# Patient Record
(demographics unavailable — no encounter records)

---

## 2017-01-01 NOTE — HP
- Maternal History


Mother's Age: 32


 Status: 


Mother's Blood Type: A+


HBSAG: Negative


Date: 16


RPR: Negative


Date: 16


Group B Strep: Positive


GBS Treated in Labor: Yes


HIV: Negative





- Maternal Risks


OB Risks:  x3.  IA x5.  h/o chlamydia, ASCUS & LGSIL





[place] Data





- Admission


Date of Admission: 17


Admission Time: 15:57


Date of Delivery: 17


Time of Delivery: 15:48


Wks Gestation by Dates: 40.5


Wks Gestation by Sono: 40.5


Infant Gender: Female


Type of Delivery: 


Apgar Score @1 Minute: 9


Apgar score @ 5 Minutes: 9


Birth Weight: 8 lb 0.221 oz


Birth Length: 19 in


Head Circumference, Admission: 35


Chest Circumference: 34.5


Abdominal Girth: 32





- Vital Signs


  ** Left Upper Arm


Blood Pressure: 64/45


Blood Pressure Mean: 51





  ** Left Calf


Blood Pressure: 64/36


Blood Pressure Mean: 45





  ** Right Upper Arm


Blood Pressure: 68/48


Blood Pressure Mean: 54





  ** Right Calf


Blood Pressure: 67/31


Blood Pressure Mean: 43





 Infant, Physical Exam





-  Infant, Admission Exam


Birth Weight: 8 lb 0.221 oz


Birth Length: 19 in


Chest Circumference: 34.5


Initial Vital Signs: 


 Initial Vital Signs











Temp Pulse Resp


 


 99.1 F   150   52 


 


 17 15:57  17 15:57  17 15:57











General Appearance: Yes: No Abnormalities


Skin: Yes: No Abnormalities


Head: Yes: No Abnormalities


Eyes: Yes: No Abnormalities


Ears: Yes: No Abnormalities


Nose: Yes: No Abnormalities


Mouth: Yes: No Abnormalities


Chest: Yes: No Abnormalities


Lungs/Respiratory: Yes: No Abnormalities


Cardiac: Yes: No Abnormalities


Abdomen: Yes: No Abnormalities


Gastrointestinal: Yes: No Abnormalities


Genitalia: No Abnormalities


Anus: Yes: No Abnormalities


Extremities: Yes: No Abnormalities


Clavicles: No abnormalities


Spine: Yes: No Abnormalities


Neuro: Yes: No Abnormalities





- Other Findings/Remarks


Other Findings/Remarks: 


1 day FT female born to 32  mom by . Enfamil. Routine care. Follow up 

[facility], [address] [place] [address], [address], upon discharge. [phone].

  Pt's mom GBS+ and treated x 1 with ampicillin with cbc results below.Will 

continue to monitor.  





 Laboratory Tests











  17





  21:45


 


WBC  21.6


 


RBC  6.40


 


Hgb  22.0


 


Hct  65.8


 


MCV  102.9


 


MCHC  33.5


 


RDW  16.8


 


Plt Count  355


 


MPV  8.1


 


Neutrophils %  59.0


 


Lymphocytes %  36.0


 


Monocytes %  5.0


 


Eosinophils %  0.0


 


Basophils %  0.0

## 2017-01-01 NOTE — DS
Physical Examination


Vital Signs: 


 Vital Signs











Temperature  98.9 F   07/07/17 20:10


 


Pulse Rate  150   07/06/17 15:57


 


Respiratory Rate  52   07/06/17 15:57


 


Blood Pressure  64/45   07/07/17 08:52


 


O2 Sat by Pulse Oximetry (%)      











Constitutional: Yes: Well Nourished, No Distress, Calm


Eyes: Yes: WNL, Conjunctiva Clear, EOM Intact


HENT: Yes: WNL, Atraumatic, Normocephalic


Neck: Yes: WNL, Supple, Trachea Midline


Cardiovascular: Yes: WNL, Regular Rate and Rhythm


Respiratory: Yes: WNL, Regular, CTA Bilaterally


Gastrointestinal: Yes: WNL, Normal Bowel Sounds


Musculoskeletal: Yes: WNL


Extremities: Yes: WNL


Edema: No


Integumentary: Yes: WNL


Neurological: Yes: WNL, Alert, Oriented


...Motor Strength: WNL


Psychiatric: Yes: WNL


Labs: 


 CBC, BMP





 07/06/17 21:45 











Discharge Summary


Condition: Good





- Instructions


Referrals: 


Alexy Harrison MD [Primary Care Provider] - 07/11/17 1:30 pm (F/u at [facility], [address] [place] [address], Phone [phone] on Tuesday 7/11/17 

at 1:30pm.)


Disposition: HOME





**Discharge Disposition





- Discharge Dispostion


Disposition: HOME


Condition at time of disposition: Good





- Referrals


Referrals: 


Alexy Harrison MD [Primary Care Provider] - 07/11/17 1:30 pm (F/u at [facility], [address] [place] [address], Phone [phone] on Tuesday 7/11/17 

at 1:30pm.)





- Patient Instructions





- Post Discharge Activity

## 2017-01-01 NOTE — PN
[place], Progress Note





- [place] Exam


Weight: 7 lb 13.046 oz


Chest Circumference: 34.5


Head Circumference: 35


Vital Signs: 


 Vital Signs











Temperature  98.9 F   17 20:10


 


Pulse Rate  150   17 15:57


 


Respiratory Rate  52   17 15:57


 


Blood Pressure  64/45   17 08:52


 


O2 Sat by Pulse Oximetry (%)      











General Appearance: Yes: No Abnormalities


Skin: Yes: No Abnormalities


Head: Yes: No Abnormalities


Eyes: Yes: No Abnormalities


Ears: Yes: No Abnormalities


Nose: Yes: No Abnormalities


Mouth: Yes: No Abnormalities


Chest: Yes: No Abnormalities


Lungs/Respiratory: Yes: No Abnormalities


Cardiac: Yes: No Abnormalities


Abdomen: Yes: No Abnormalities


Gastrointestinal: Yes: No Abnormalities


Genitalia: No Abnormalities


Anus: Yes: No Abnormalities


Extremities: Yes: No Abnormalities


Spine: Yes: No Abnormalities


Neuro: Yes: No Abnormalities


Cry: No Abnormalities





- Other Data/Findings


Labs, Other Data: 


 Intake





Intake, Oral Amount              10


Intake, Oral Amount              25


Intake, Oral Amount              15


Intake, Oral Amount              25





 Output





Number of Voids                  1


Stool Size                       Small


Stool Size                       Moderate


Stool Size                       Moderate


[place] Stool Description        Green,Soft


 Stool Description        Green,Soft


 Stool Description        Green,Soft





 Transcutaneous Bilirubin











Transcutaneous Bilirubin       17





performed                      


 


Transcutaneous Bilirubin       6.5





result                         











 Baby's Blood Type, Nikki











Cord Blood Type  O POSITIVE   17  15:48    


 


RAEANN, Poly Interpret  Negative  (NEGATIVE)   17  15:48    











Other Findings/Remarks: 


2 day FT female born to 32  mom by . Enfamil. Routine care. Follow up 

[facility], [address] [place] [address], [address], upon discharge. [phone]. 

2017 at 1:30pm. Pt's mom GBS+ and treated x 1 with ampicillin with 

cbc results below.Will continue to monitor.  





 Laboratory Tests











  17





  21:45


 


WBC  21.6


 


RBC  6.40


 


Hgb  22.0


 


Hct  65.8


 


MCV  102.9


 


MCHC  33.5


 


RDW  16.8


 


Plt Count  355


 


MPV  8.1


 


Neutrophils %  59.0


 


Lymphocytes %  36.0


 


Monocytes %  5.0


 


Eosinophils %  0.0


 


Basophils %  0.0








Medications








Discontinued Medications





Hepatitis B Vaccine (Engerix-B 10 Mcg/0.5 Ml *Pediatric* -)  10 mcg IM .ONCE ONE


   Stop: 17 15:01


   Last Admin: 17 16:40 Dose:  10 mcg